# Patient Record
Sex: FEMALE
[De-identification: names, ages, dates, MRNs, and addresses within clinical notes are randomized per-mention and may not be internally consistent; named-entity substitution may affect disease eponyms.]

---

## 2021-10-06 ENCOUNTER — NURSE TRIAGE (OUTPATIENT)
Dept: OTHER | Facility: CLINIC | Age: 61
End: 2021-10-06

## 2021-10-07 NOTE — TELEPHONE ENCOUNTER
Brief description of triage: no triage    mom calling about daughter who lives in [place] and was bitten by dog. Asking for medical records from [employer], also states daughter is no longer on her plan for [employer]    Informed that PCP would be best resource for medical records and advised she see PCP or ED for the dog bite. If questions for [employer] advised to call customer service # on her card    No triage was completed - info only call    Care advice provided, patient verbalizes understanding; denies any other questions or concerns; instructed to call back for any new or worsening symptoms. This triage is a result of a call to [address] [place] [address]. Please do not respond to the triage nurse through this encounter. Any subsequent communication should be directly with the patient. Reason for Disposition   General information question, no triage required and triager able to answer question    Answer Assessment - Initial Assessment Questions  1. REASON FOR CALL or QUESTION: \"What is your reason for calling today? \" or \"How can I best help you? \" or \"What question do you have that I can help answer? \"  Calling about daughter living in [place] that was bitten by a dog    Protocols used: INFORMATION ONLY CALL - NO TRIAGE[facility]

## 2021-12-22 ENCOUNTER — NURSE TRIAGE (OUTPATIENT)
Dept: OTHER | Facility: CLINIC | Age: 61
End: 2021-12-22

## 2021-12-22 NOTE — TELEPHONE ENCOUNTER
Subjective: Caller states \"I have a family member coming to [address] soon. She has received 2 doses of sinovac vaccines in [facility], but would like to know if she can get the booster of Indra Turner, BRITTA&BRITTA, or moderna while in [address]. \"  RN researched information on [facility] and was not able to find information regarding Sinovac vaccine. RN advised called to contact local Health Department for more information. This triage is a result of a call to [address] [facility]. Please do not respond to the triage nurse through this encounter. Any subsequent communication should be directly with the patient.

## 2024-03-19 ENCOUNTER — HOSPITAL ENCOUNTER (OUTPATIENT)
Dept: RADIOLOGY | Facility: HOSPITAL | Age: 64
Discharge: HOME | End: 2024-03-19
Payer: COMMERCIAL

## 2024-03-19 DIAGNOSIS — E78.2 MIXED HYPERLIPIDEMIA: ICD-10-CM

## 2024-03-19 PROCEDURE — 75571 CT HRT W/O DYE W/CA TEST: CPT
